# Patient Record
Sex: FEMALE | Race: WHITE
[De-identification: names, ages, dates, MRNs, and addresses within clinical notes are randomized per-mention and may not be internally consistent; named-entity substitution may affect disease eponyms.]

---

## 2021-10-18 ENCOUNTER — HOSPITAL ENCOUNTER (EMERGENCY)
Dept: HOSPITAL 4 - SED | Age: 61
Discharge: HOME | End: 2021-10-18
Payer: COMMERCIAL

## 2021-10-18 VITALS — SYSTOLIC BLOOD PRESSURE: 150 MMHG

## 2021-10-18 VITALS — BODY MASS INDEX: 35.08 KG/M2 | HEIGHT: 59 IN | WEIGHT: 174 LBS

## 2021-10-18 VITALS — SYSTOLIC BLOOD PRESSURE: 133 MMHG
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
## 2021-10-18 DIAGNOSIS — K21.9: ICD-10-CM
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
## 2021-10-18 DIAGNOSIS — I10: ICD-10-CM

## 2021-10-18 DIAGNOSIS — S40.012A: ICD-10-CM
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
## 2021-10-18 DIAGNOSIS — J44.9: ICD-10-CM

## 2021-10-18 DIAGNOSIS — E11.9: ICD-10-CM

## 2021-10-18 DIAGNOSIS — Z88.8: ICD-10-CM

## 2021-10-18 DIAGNOSIS — Z88.2: ICD-10-CM
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
## 2021-10-18 DIAGNOSIS — S29.012A: Primary | ICD-10-CM

## 2021-10-18 PROCEDURE — 71045 X-RAY EXAM CHEST 1 VIEW: CPT

## 2021-10-18 PROCEDURE — 72080 X-RAY EXAM THORACOLMB 2/> VW: CPT

## 2021-10-18 PROCEDURE — 73060 X-RAY EXAM OF HUMERUS: CPT

## 2021-10-18 PROCEDURE — 99284 EMERGENCY DEPT VISIT MOD MDM: CPT

## 2021-10-18 PROCEDURE — 96372 THER/PROPH/DIAG INJ SC/IM: CPT

## 2021-10-18 PROCEDURE — 72170 X-RAY EXAM OF PELVIS: CPT

## 2021-10-18 NOTE — NUR
Pt has full ROM of left upper extremity cap refill is less than 2 seconds no 
loss of sensation pulses 2+ and palpable.
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
## 2021-10-18 NOTE — NUR
Patient given written and verbal discharge instructions and verbalizes 
understanding.  ER MD discussed with patient the results and treatment 
provided. Patient in stable condition. ID arm band removed. 

Rx of Robaxin and naproxen given. Patient educated on pain management and to 
follow up with PMD. Pain Scale 0/10.

Opportunity for questions provided and answered. Medication side effect fact 
sheet provided.